# Patient Record
Sex: FEMALE | Race: BLACK OR AFRICAN AMERICAN | NOT HISPANIC OR LATINO | Employment: FULL TIME | URBAN - METROPOLITAN AREA
[De-identification: names, ages, dates, MRNs, and addresses within clinical notes are randomized per-mention and may not be internally consistent; named-entity substitution may affect disease eponyms.]

---

## 2024-03-25 ENCOUNTER — OCCUPATIONAL HEALTH (OUTPATIENT)
Dept: URGENT CARE | Facility: CLINIC | Age: 24
End: 2024-03-25

## 2024-03-25 DIAGNOSIS — Z13.9 ENCOUNTER FOR SCREENING: Primary | ICD-10-CM

## 2024-03-25 PROCEDURE — 86580 TB INTRADERMAL TEST: CPT | Mod: S$GLB,,, | Performed by: PHYSICIAN ASSISTANT

## 2024-03-25 NOTE — PROGRESS NOTES
Pt presents here for tb skin placement ; lt arm and informed to come back 03/26//24 for PPD reading Wednesday @1:31 PM    Tracey called from cardiology. She is looking for a copy of an EKG that we did on 02/27/2020.  She said that it is not in his chart.  Dr Kennedy is his doctor.  Please call Tracey at [phone] when this is scanned in to chart    ** patient has an appointment scheduled there today at 1:00